# Patient Record
Sex: FEMALE | Race: WHITE | NOT HISPANIC OR LATINO | Employment: FULL TIME | URBAN - METROPOLITAN AREA
[De-identification: names, ages, dates, MRNs, and addresses within clinical notes are randomized per-mention and may not be internally consistent; named-entity substitution may affect disease eponyms.]

---

## 2023-05-25 ENCOUNTER — OFFICE VISIT (OUTPATIENT)
Dept: PRIMARY CARE | Facility: CLINIC | Age: 38
End: 2023-05-25
Payer: COMMERCIAL

## 2023-05-25 VITALS
RESPIRATION RATE: 16 BRPM | HEIGHT: 64 IN | OXYGEN SATURATION: 100 % | DIASTOLIC BLOOD PRESSURE: 70 MMHG | SYSTOLIC BLOOD PRESSURE: 124 MMHG | BODY MASS INDEX: 31.41 KG/M2 | WEIGHT: 184 LBS | HEART RATE: 77 BPM | TEMPERATURE: 97.2 F

## 2023-05-25 DIAGNOSIS — M54.2 NECK PAIN, ACUTE: Primary | ICD-10-CM

## 2023-05-25 PROCEDURE — 99203 OFFICE O/P NEW LOW 30 MIN: CPT | Performed by: FAMILY MEDICINE

## 2023-05-25 PROCEDURE — 1036F TOBACCO NON-USER: CPT | Performed by: FAMILY MEDICINE

## 2023-05-25 RX ORDER — TIZANIDINE HYDROCHLORIDE 4 MG/1
4 CAPSULE, GELATIN COATED ORAL 3 TIMES DAILY PRN
COMMUNITY
Start: 2023-05-23 | End: 2023-07-11 | Stop reason: ALTCHOICE

## 2023-05-25 RX ORDER — NAPROXEN 500 MG/1
500 TABLET ORAL
COMMUNITY
Start: 2023-05-23 | End: 2023-05-30

## 2023-05-25 RX ORDER — METHYLPREDNISOLONE 4 MG/1
TABLET ORAL
Qty: 21 TABLET | Refills: 0 | Status: SHIPPED | OUTPATIENT
Start: 2023-05-25 | End: 2023-06-01

## 2023-05-25 RX ORDER — LIDOCAINE 50 MG/G
1 PATCH TOPICAL
Qty: 14 PATCH | Refills: 0 | COMMUNITY
Start: 2023-05-23 | End: 2023-06-06

## 2023-05-25 ASSESSMENT — ENCOUNTER SYMPTOMS
HEADACHES: 1
NECK PAIN: 1
LOSS OF SENSATION IN FEET: 0
FEVER: 0
LEG PAIN: 0
WEAKNESS: 0
TROUBLE SWALLOWING: 0
PARESIS: 0
TINGLING: 1
VISUAL CHANGE: 0
SYNCOPE: 0
DEPRESSION: 0
PHOTOPHOBIA: 0
WEIGHT LOSS: 0
NUMBNESS: 0
OCCASIONAL FEELINGS OF UNSTEADINESS: 0

## 2023-05-25 ASSESSMENT — PATIENT HEALTH QUESTIONNAIRE - PHQ9
SUM OF ALL RESPONSES TO PHQ9 QUESTIONS 1 AND 2: 0
1. LITTLE INTEREST OR PLEASURE IN DOING THINGS: NOT AT ALL
2. FEELING DOWN, DEPRESSED OR HOPELESS: NOT AT ALL

## 2023-05-25 NOTE — PROGRESS NOTES
"Subjective   Chief Complaint: new patient to establish care (Neck pain and upper back pain x 1 month worse within last week).    Neck Pain   This is a new problem. The current episode started in the past 7 days. The problem occurs constantly. The problem has been unchanged. The pain is associated with nothing. The pain is present in the occipital region, right side, midline and left side. The quality of the pain is described as aching (pulling). The pain is at a severity of 5/10. The pain is moderate. The symptoms are aggravated by bending and position (working at the computer and picking up things). The pain is Same all the time. Stiffness is present All day. Associated symptoms include headaches and tingling. Pertinent negatives include no chest pain, fever, leg pain, numbness, pain with swallowing, paresis, photophobia, syncope, trouble swallowing, visual change, weakness or weight loss. She has tried muscle relaxants and NSAIDs (lidocaine patches) for the symptoms. The treatment provided mild relief.      She went to [facility] care at [facility] Tuesday and they did an exam and sent her home with Naprosyn, Tizanidine and Lidocaine patches. No xrays were done or any other testing. Asked to come see a PCP.    Dee Richmond is a 37 y.o. female who presents for new patient to establish care (Neck pain and upper back pain x 1 month worse within last week).    Review of Systems   Constitutional:  Negative for fever and weight loss.   HENT:  Negative for trouble swallowing.    Eyes:  Negative for photophobia.   Cardiovascular:  Negative for chest pain and syncope.   Musculoskeletal:  Positive for neck pain.   Neurological:  Positive for tingling and headaches. Negative for weakness and numbness.       Objective   Blood Pressure 124/70   Pulse 77   Temperature 36.2 °C (97.2 °F)   Respiration 16   Height 1.626 m (5' 4\")   Weight 83.5 kg (184 lb)   Oxygen Saturation 100%   Body Mass Index 31.58 kg/m²   BSA Body surface " area is 1.94 meters squared.      Physical Exam  Vitals and nursing note reviewed.   Constitutional:       General: She is not in acute distress.     Appearance: Normal appearance. She is normal weight. She is not ill-appearing.   HENT:      Head: Normocephalic.      Right Ear: Tympanic membrane, ear canal and external ear normal.      Left Ear: Tympanic membrane, ear canal and external ear normal.      Nose: Nose normal. No rhinorrhea.      Mouth/Throat:      Mouth: Mucous membranes are moist.      Pharynx: Oropharynx is clear.   Eyes:      Extraocular Movements: Extraocular movements intact.      Conjunctiva/sclera: Conjunctivae normal.      Pupils: Pupils are equal, round, and reactive to light.   Cardiovascular:      Rate and Rhythm: Normal rate and regular rhythm.      Pulses: Normal pulses.      Heart sounds: Normal heart sounds.   Pulmonary:      Effort: Pulmonary effort is normal. No respiratory distress.      Breath sounds: Normal breath sounds. No wheezing.   Abdominal:      General: Abdomen is flat. Bowel sounds are normal.      Palpations: Abdomen is soft.      Tenderness: There is no abdominal tenderness. There is no guarding or rebound.      Hernia: No hernia is present.   Musculoskeletal:         General: Normal range of motion.      Cervical back: Normal range of motion and neck supple. No rigidity or tenderness.      Right lower leg: No edema.      Left lower leg: No edema.   Lymphadenopathy:      Cervical: No cervical adenopathy.   Skin:     General: Skin is warm and dry.      Capillary Refill: Capillary refill takes more than 3 seconds.   Neurological:      General: No focal deficit present.      Mental Status: She is alert and oriented to person, place, and time.      Sensory: No sensory deficit.      Motor: No weakness.      Coordination: Coordination normal.   Psychiatric:         Mood and Affect: Mood normal.         Behavior: Behavior normal.         Thought Content: Thought content normal.          Judgment: Judgment normal.       No results found for any previous visit.     Current Outpatient Medications on File Prior to Visit   Medication Sig Dispense Refill    lidocaine (Lidoderm) 5 % patch Apply 1 patch topically once daily. 14 patch 0    naproxen (Naprosyn) 500 mg tablet Take 1 tablet (500 mg) by mouth.      tiZANidine (Zanaflex) 4 mg capsule Take 1 capsule (4 mg) by mouth 3 times a day as needed.       No current facility-administered medications on file prior to visit.     No images are attached to the encounter.            Assessment/Plan   Problem List Items Addressed This Visit       Neck pain, acute - Primary    Relevant Medications    methylPREDNISolone (Medrol Dospak) 4 mg tablets    Other Relevant Orders    XR cervical spine 2-3 views    Referral to Physical Therapy   While on Medrol dose pack may hold Naproxsyn  Please consider taking Pepcid 20 mg 2 times a day    Will get xrays     Start PT if no improvement.     Try to stop Tizanidine If pain gets worse please call and may try a different muscle relaxor    F/up in 6 weeks for neck pain

## 2023-05-25 NOTE — PATIENT INSTRUCTIONS
While on Medrol dose pack may hold Naproxsyn  Please consider taking Pepcid 20 mg 2 times a day    Will get xrays     Start PT if no improvement.     Try to stop Tizanidine If pain gets worse please call and may try a different muscle relaxer.    F/up in 6 weeks

## 2023-07-11 ENCOUNTER — OFFICE VISIT (OUTPATIENT)
Dept: PRIMARY CARE | Facility: CLINIC | Age: 38
End: 2023-07-11
Payer: COMMERCIAL

## 2023-07-11 VITALS
DIASTOLIC BLOOD PRESSURE: 83 MMHG | HEART RATE: 89 BPM | WEIGHT: 183 LBS | SYSTOLIC BLOOD PRESSURE: 125 MMHG | RESPIRATION RATE: 16 BRPM | TEMPERATURE: 97.5 F | BODY MASS INDEX: 31.24 KG/M2 | OXYGEN SATURATION: 97 % | HEIGHT: 64 IN

## 2023-07-11 DIAGNOSIS — N92.6 IRREGULAR MENSES: ICD-10-CM

## 2023-07-11 DIAGNOSIS — E55.9 VITAMIN D DEFICIENCY: ICD-10-CM

## 2023-07-11 DIAGNOSIS — E53.8 VITAMIN B 12 DEFICIENCY: ICD-10-CM

## 2023-07-11 DIAGNOSIS — Z00.00 ENCOUNTER FOR ANNUAL GENERAL MEDICAL EXAMINATION WITHOUT ABNORMAL FINDINGS IN ADULT: ICD-10-CM

## 2023-07-11 DIAGNOSIS — M54.2 NECK PAIN, ACUTE: Primary | ICD-10-CM

## 2023-07-11 PROCEDURE — 1036F TOBACCO NON-USER: CPT | Performed by: FAMILY MEDICINE

## 2023-07-11 PROCEDURE — 99214 OFFICE O/P EST MOD 30 MIN: CPT | Performed by: FAMILY MEDICINE

## 2023-07-11 ASSESSMENT — ENCOUNTER SYMPTOMS
POLYPHAGIA: 0
POLYDIPSIA: 0
SHORTNESS OF BREATH: 0
HEADACHES: 0
CHEST TIGHTNESS: 0
DIFFICULTY URINATING: 0
CHILLS: 0
DEPRESSION: 0
ABDOMINAL PAIN: 0
BLOOD IN STOOL: 0
FEVER: 0
CONSTIPATION: 0
DIZZINESS: 0
DIARRHEA: 0
LOSS OF SENSATION IN FEET: 0
UNEXPECTED WEIGHT CHANGE: 1
FREQUENCY: 0
WHEEZING: 0
AGITATION: 0
LIGHT-HEADEDNESS: 0
SORE THROAT: 0
FATIGUE: 1
RHINORRHEA: 0
APPETITE CHANGE: 0
OCCASIONAL FEELINGS OF UNSTEADINESS: 0
BACK PAIN: 0
SINUS PRESSURE: 0
DYSURIA: 0
CONFUSION: 0
ARTHRALGIAS: 0
PALPITATIONS: 0
NUMBNESS: 0

## 2023-07-11 ASSESSMENT — PATIENT HEALTH QUESTIONNAIRE - PHQ9
2. FEELING DOWN, DEPRESSED OR HOPELESS: NOT AT ALL
SUM OF ALL RESPONSES TO PHQ9 QUESTIONS 1 AND 2: 0
1. LITTLE INTEREST OR PLEASURE IN DOING THINGS: NOT AT ALL

## 2023-07-11 NOTE — PATIENT INSTRUCTIONS
F/U in August for a PE  Fasting blood work at your convenience    Complete PT for Home exercise program    Call if neck is worse

## 2023-07-11 NOTE — PROGRESS NOTES
"Subjective   Patient ID: Dee Richmond is a 38 y.o. female who presents for follow up on neck  pain  (Patient is going to physical therapy).  Today she is accompanied by alone.     Patient has been following with PT and has had significant improvement with range of motion and decreased pain. There is some room to improve. She is consistently doing home exercises as well.     Last summer she stopped nursing/pumping, since then she has been experiencing significant weight gain 20 lbs, acne, irregular menstrual cycles, heavier bleeding on days 1 & 2, and increased premenstrual syndrome. She is not currently on any forms of birth control.     Review of Systems   Constitutional:  Positive for fatigue and unexpected weight change (20lbs gain in 1 yr). Negative for appetite change, chills and fever.   HENT:  Negative for congestion, ear pain, hearing loss, mouth sores, nosebleeds, postnasal drip, rhinorrhea, sinus pressure and sore throat.    Eyes:  Negative for visual disturbance.   Respiratory:  Negative for chest tightness, shortness of breath and wheezing.    Cardiovascular:  Negative for chest pain, palpitations and leg swelling.   Gastrointestinal:  Negative for abdominal pain, blood in stool, constipation and diarrhea.   Endocrine: Negative for cold intolerance, heat intolerance, polydipsia and polyphagia.   Genitourinary:  Negative for difficulty urinating, dysuria, frequency and urgency.   Musculoskeletal:  Negative for arthralgias and back pain.   Neurological:  Negative for dizziness, light-headedness, numbness and headaches.   Psychiatric/Behavioral:  Negative for agitation, behavioral problems and confusion.        Objective   Blood Pressure 125/83   Pulse 89   Temperature 36.4 °C (97.5 °F)   Respiration 16   Height 1.626 m (5' 4\")   Weight 83 kg (183 lb)   Oxygen Saturation 97%   Body Mass Index 31.41 kg/m²   BSA: 1.94 meters squared  Growth percentiles: Facility age limit for growth %reno is 20 " years. Facility age limit for growth %reno is 20 years.     Physical Exam  Vitals and nursing note reviewed.   Constitutional:       General: She is not in acute distress.     Appearance: Normal appearance. She is normal weight. She is not ill-appearing.   HENT:      Head: Normocephalic.      Right Ear: Tympanic membrane, ear canal and external ear normal.      Left Ear: Tympanic membrane, ear canal and external ear normal.      Nose: Nose normal. No rhinorrhea.      Mouth/Throat:      Mouth: Mucous membranes are moist.      Pharynx: Oropharynx is clear.   Eyes:      Extraocular Movements: Extraocular movements intact.      Conjunctiva/sclera: Conjunctivae normal.      Pupils: Pupils are equal, round, and reactive to light.   Cardiovascular:      Rate and Rhythm: Normal rate and regular rhythm.      Pulses: Normal pulses.      Heart sounds: Normal heart sounds.   Pulmonary:      Effort: Pulmonary effort is normal. No respiratory distress.      Breath sounds: Normal breath sounds. No wheezing.   Abdominal:      General: Abdomen is flat. Bowel sounds are normal.      Palpations: Abdomen is soft.      Tenderness: There is no abdominal tenderness. There is no guarding or rebound.      Hernia: No hernia is present.   Musculoskeletal:         General: Normal range of motion.      Cervical back: Normal range of motion and neck supple. No rigidity or tenderness.      Right lower leg: No edema.      Left lower leg: No edema.   Lymphadenopathy:      Cervical: No cervical adenopathy.   Skin:     General: Skin is warm and dry.      Capillary Refill: Capillary refill takes more than 3 seconds.   Neurological:      General: No focal deficit present.      Mental Status: She is alert and oriented to person, place, and time.      Sensory: No sensory deficit.      Motor: No weakness.      Coordination: Coordination normal.   Psychiatric:         Mood and Affect: Mood normal.         Behavior: Behavior normal.         Thought Content:  Thought content normal.         Judgment: Judgment normal.         Assessment/Plan   Problem List Items Addressed This Visit       Neck pain, acute - Primary     Other Visit Diagnoses       Encounter for annual general medical examination without abnormal findings in adult        Relevant Orders    Lipid Panel    CBC and Auto Differential    Comprehensive Metabolic Panel    Irregular menses        Relevant Orders    Insulin, Random    Iron and TIBC    Ferritin    TSH with reflex to Free T4 if abnormal    Folate    US pelvis transvaginal    FSH & LH    Progesterone    Estrogens, Total    Vitamin D deficiency        Relevant Orders    Vitamin D, Total    Vitamin B 12 deficiency        Relevant Orders    Vitamin B12              F/U in August for a PE

## 2023-07-15 ENCOUNTER — LAB (OUTPATIENT)
Dept: LAB | Facility: LAB | Age: 38
End: 2023-07-15
Payer: COMMERCIAL

## 2023-07-15 DIAGNOSIS — E53.8 VITAMIN B 12 DEFICIENCY: ICD-10-CM

## 2023-07-15 DIAGNOSIS — Z00.00 ENCOUNTER FOR ANNUAL GENERAL MEDICAL EXAMINATION WITHOUT ABNORMAL FINDINGS IN ADULT: ICD-10-CM

## 2023-07-15 DIAGNOSIS — E55.9 VITAMIN D DEFICIENCY: ICD-10-CM

## 2023-07-15 DIAGNOSIS — N92.6 IRREGULAR MENSES: ICD-10-CM

## 2023-07-15 LAB
ALANINE AMINOTRANSFERASE (SGPT) (U/L) IN SER/PLAS: 20 U/L (ref 7–45)
ALBUMIN (G/DL) IN SER/PLAS: 4.6 G/DL (ref 3.4–5)
ALKALINE PHOSPHATASE (U/L) IN SER/PLAS: 64 U/L (ref 33–110)
ANION GAP IN SER/PLAS: 12 MMOL/L (ref 10–20)
ASPARTATE AMINOTRANSFERASE (SGOT) (U/L) IN SER/PLAS: 19 U/L (ref 9–39)
BASOPHILS (10*3/UL) IN BLOOD BY AUTOMATED COUNT: 0.03 X10E9/L (ref 0–0.1)
BASOPHILS/100 LEUKOCYTES IN BLOOD BY AUTOMATED COUNT: 0.6 % (ref 0–2)
BILIRUBIN TOTAL (MG/DL) IN SER/PLAS: 0.8 MG/DL (ref 0–1.2)
CALCIDIOL (25 OH VITAMIN D3) (NG/ML) IN SER/PLAS: 42 NG/ML
CALCIUM (MG/DL) IN SER/PLAS: 9.7 MG/DL (ref 8.6–10.6)
CARBON DIOXIDE, TOTAL (MMOL/L) IN SER/PLAS: 27 MMOL/L (ref 21–32)
CHLORIDE (MMOL/L) IN SER/PLAS: 104 MMOL/L (ref 98–107)
CHOLESTEROL (MG/DL) IN SER/PLAS: 166 MG/DL (ref 0–199)
CHOLESTEROL IN HDL (MG/DL) IN SER/PLAS: 66.6 MG/DL
CHOLESTEROL/HDL RATIO: 2.5
CREATININE (MG/DL) IN SER/PLAS: 0.74 MG/DL (ref 0.5–1.05)
EOSINOPHILS (10*3/UL) IN BLOOD BY AUTOMATED COUNT: 0.3 X10E9/L (ref 0–0.7)
EOSINOPHILS/100 LEUKOCYTES IN BLOOD BY AUTOMATED COUNT: 6.1 % (ref 0–6)
ERYTHROCYTE DISTRIBUTION WIDTH (RATIO) BY AUTOMATED COUNT: 12.9 % (ref 11.5–14.5)
ERYTHROCYTE MEAN CORPUSCULAR HEMOGLOBIN CONCENTRATION (G/DL) BY AUTOMATED: 32.2 G/DL (ref 32–36)
ERYTHROCYTE MEAN CORPUSCULAR VOLUME (FL) BY AUTOMATED COUNT: 90 FL (ref 80–100)
ERYTHROCYTES (10*6/UL) IN BLOOD BY AUTOMATED COUNT: 4.43 X10E12/L (ref 4–5.2)
FERRITIN (UG/LL) IN SER/PLAS: 28 UG/L (ref 8–150)
GFR FEMALE: >90 ML/MIN/1.73M2
GLUCOSE (MG/DL) IN SER/PLAS: 86 MG/DL (ref 74–99)
HEMATOCRIT (%) IN BLOOD BY AUTOMATED COUNT: 39.7 % (ref 36–46)
HEMOGLOBIN (G/DL) IN BLOOD: 12.8 G/DL (ref 12–16)
IMMATURE GRANULOCYTES/100 LEUKOCYTES IN BLOOD BY AUTOMATED COUNT: 0.4 % (ref 0–0.9)
INSULIN: 12 UIU/ML (ref 3–25)
IRON (UG/DL) IN SER/PLAS: 75 UG/DL (ref 35–150)
IRON BINDING CAPACITY (UG/DL) IN SER/PLAS: 439 UG/DL (ref 240–445)
IRON SATURATION (%) IN SER/PLAS: 17 % (ref 25–45)
LDL: 69 MG/DL (ref 0–99)
LEUKOCYTES (10*3/UL) IN BLOOD BY AUTOMATED COUNT: 4.9 X10E9/L (ref 4.4–11.3)
LYMPHOCYTES (10*3/UL) IN BLOOD BY AUTOMATED COUNT: 1 X10E9/L (ref 1.2–4.8)
LYMPHOCYTES/100 LEUKOCYTES IN BLOOD BY AUTOMATED COUNT: 20.4 % (ref 13–44)
MONOCYTES (10*3/UL) IN BLOOD BY AUTOMATED COUNT: 0.23 X10E9/L (ref 0.1–1)
MONOCYTES/100 LEUKOCYTES IN BLOOD BY AUTOMATED COUNT: 4.7 % (ref 2–10)
NEUTROPHILS (10*3/UL) IN BLOOD BY AUTOMATED COUNT: 3.33 X10E9/L (ref 1.2–7.7)
NEUTROPHILS/100 LEUKOCYTES IN BLOOD BY AUTOMATED COUNT: 67.8 % (ref 40–80)
NRBC (PER 100 WBCS) BY AUTOMATED COUNT: 0 /100 WBC (ref 0–0)
PLATELETS (10*3/UL) IN BLOOD AUTOMATED COUNT: 279 X10E9/L (ref 150–450)
POTASSIUM (MMOL/L) IN SER/PLAS: 4.3 MMOL/L (ref 3.5–5.3)
PROGESTERONE (NG/ML) IN SER/PLAS: 0.3 NG/ML
PROTEIN TOTAL: 7.3 G/DL (ref 6.4–8.2)
SODIUM (MMOL/L) IN SER/PLAS: 139 MMOL/L (ref 136–145)
THYROTROPIN (MIU/L) IN SER/PLAS BY DETECTION LIMIT <= 0.05 MIU/L: 2.41 MIU/L (ref 0.44–3.98)
TRIGLYCERIDE (MG/DL) IN SER/PLAS: 152 MG/DL (ref 0–149)
UREA NITROGEN (MG/DL) IN SER/PLAS: 8 MG/DL (ref 6–23)
VLDL: 30 MG/DL (ref 0–40)

## 2023-07-15 PROCEDURE — 83525 ASSAY OF INSULIN: CPT

## 2023-07-15 PROCEDURE — 84144 ASSAY OF PROGESTERONE: CPT

## 2023-07-15 PROCEDURE — 83001 ASSAY OF GONADOTROPIN (FSH): CPT

## 2023-07-15 PROCEDURE — 82607 VITAMIN B-12: CPT

## 2023-07-15 PROCEDURE — 83002 ASSAY OF GONADOTROPIN (LH): CPT

## 2023-07-15 PROCEDURE — 82672 ASSAY OF ESTROGEN: CPT

## 2023-07-15 PROCEDURE — 80061 LIPID PANEL: CPT

## 2023-07-15 PROCEDURE — 83550 IRON BINDING TEST: CPT

## 2023-07-15 PROCEDURE — 85025 COMPLETE CBC W/AUTO DIFF WBC: CPT

## 2023-07-15 PROCEDURE — 36415 COLL VENOUS BLD VENIPUNCTURE: CPT

## 2023-07-15 PROCEDURE — 82306 VITAMIN D 25 HYDROXY: CPT

## 2023-07-15 PROCEDURE — 83540 ASSAY OF IRON: CPT

## 2023-07-15 PROCEDURE — 84443 ASSAY THYROID STIM HORMONE: CPT

## 2023-07-15 PROCEDURE — 80053 COMPREHEN METABOLIC PANEL: CPT

## 2023-07-15 PROCEDURE — 82746 ASSAY OF FOLIC ACID SERUM: CPT

## 2023-07-15 PROCEDURE — 82728 ASSAY OF FERRITIN: CPT

## 2023-07-16 LAB
COBALAMIN (VITAMIN B12) (PG/ML) IN SER/PLAS: 283 PG/ML (ref 211–911)
FOLATE (NG/ML) IN SER/PLAS: >24 NG/ML
FOLLITROPIN (IU/L) IN SER/PLAS: 3.3 IU/L
LUTEINIZING HORMONE (IU/ML) IN SER/PLAS: 4.2 IU/L

## 2023-07-20 ENCOUNTER — CLINICAL SUPPORT (OUTPATIENT)
Dept: PRIMARY CARE | Facility: CLINIC | Age: 38
End: 2023-07-20
Payer: COMMERCIAL

## 2023-07-20 DIAGNOSIS — E53.8 VITAMIN B 12 DEFICIENCY: ICD-10-CM

## 2023-07-20 LAB — ESTROGEN,TOTAL: 284 PG/ML

## 2023-07-20 PROCEDURE — 96372 THER/PROPH/DIAG INJ SC/IM: CPT | Performed by: FAMILY MEDICINE

## 2023-07-20 RX ORDER — CYANOCOBALAMIN 1000 UG/ML
1000 INJECTION, SOLUTION INTRAMUSCULAR; SUBCUTANEOUS ONCE
Status: COMPLETED | OUTPATIENT
Start: 2023-07-20 | End: 2023-07-20

## 2023-07-20 RX ADMIN — CYANOCOBALAMIN 1000 MCG: 1000 INJECTION, SOLUTION INTRAMUSCULAR; SUBCUTANEOUS at 18:08

## 2023-07-20 NOTE — PROGRESS NOTES
Pt presents for B12 injection per Dr Frank. 1 mL given subcutaneously in upper L arm; no issues w/ injection. Pt tolerated well.

## 2023-07-20 NOTE — RESULT ENCOUNTER NOTE
Patient has a possible left hemorrhagic ovarian cyst and will need a follow-up ultrasound in 4 to 6 weeks.  Otherwise normal ultrasound

## 2023-07-27 ENCOUNTER — OFFICE VISIT (OUTPATIENT)
Dept: PRIMARY CARE | Facility: CLINIC | Age: 38
End: 2023-07-27
Payer: COMMERCIAL

## 2023-07-27 VITALS
WEIGHT: 183 LBS | DIASTOLIC BLOOD PRESSURE: 81 MMHG | BODY MASS INDEX: 31.24 KG/M2 | HEART RATE: 85 BPM | TEMPERATURE: 97.7 F | OXYGEN SATURATION: 97 % | SYSTOLIC BLOOD PRESSURE: 115 MMHG | RESPIRATION RATE: 16 BRPM | HEIGHT: 64 IN

## 2023-07-27 DIAGNOSIS — E53.8 VITAMIN B 12 DEFICIENCY: ICD-10-CM

## 2023-07-27 DIAGNOSIS — N92.6 IRREGULAR MENSTRUATION: Primary | ICD-10-CM

## 2023-07-27 DIAGNOSIS — N83.202 CYST OF LEFT OVARY: ICD-10-CM

## 2023-07-27 DIAGNOSIS — F41.9 ANXIETY: ICD-10-CM

## 2023-07-27 PROCEDURE — 99214 OFFICE O/P EST MOD 30 MIN: CPT | Performed by: FAMILY MEDICINE

## 2023-07-27 PROCEDURE — 1036F TOBACCO NON-USER: CPT | Performed by: FAMILY MEDICINE

## 2023-07-27 PROCEDURE — 96372 THER/PROPH/DIAG INJ SC/IM: CPT | Performed by: FAMILY MEDICINE

## 2023-07-27 RX ORDER — FLUOXETINE 10 MG/1
10 CAPSULE ORAL DAILY
Qty: 30 CAPSULE | Refills: 1 | Status: SHIPPED | OUTPATIENT
Start: 2023-07-27 | End: 2023-08-22

## 2023-07-27 RX ORDER — CYANOCOBALAMIN 1000 UG/ML
1000 INJECTION, SOLUTION INTRAMUSCULAR; SUBCUTANEOUS ONCE
Status: COMPLETED | OUTPATIENT
Start: 2023-07-27 | End: 2023-07-27

## 2023-07-27 RX ADMIN — CYANOCOBALAMIN 1000 MCG: 1000 INJECTION, SOLUTION INTRAMUSCULAR; SUBCUTANEOUS at 15:18

## 2023-07-27 ASSESSMENT — ENCOUNTER SYMPTOMS
IRRITABILITY: 1
PANIC: 1
DEPRESSION: 0
NERVOUS/ANXIOUS: 1
DECREASED CONCENTRATION: 1
OCCASIONAL FEELINGS OF UNSTEADINESS: 0
INSOMNIA: 1
PALPITATIONS: 1
LOSS OF SENSATION IN FEET: 0
SHORTNESS OF BREATH: 1
DEPRESSED MOOD: 1
HYPERVENTILATION: 0
DIZZINESS: 0
THOUGHT CONTENT - OBSESSIONS: 0
NAUSEA: 0
COMPULSIONS: 0
CONFUSION: 1
RESTLESSNESS: 1
MUSCLE TENSION: 0
FEELING OF CHOKING: 1
MALAISE: 1

## 2023-07-27 ASSESSMENT — PATIENT HEALTH QUESTIONNAIRE - PHQ9
SUM OF ALL RESPONSES TO PHQ9 QUESTIONS 1 AND 2: 0
2. FEELING DOWN, DEPRESSED OR HOPELESS: NOT AT ALL
1. LITTLE INTEREST OR PLEASURE IN DOING THINGS: NOT AT ALL

## 2023-07-27 NOTE — PROGRESS NOTES
"Subjective   Patient ID: Dee Richmond is a 38 y.o. female who presents for review labs and ultrasound results.  Today she is accompanied by alone.     Anxiety  Presents for initial visit. Onset was 1 to 5 years ago. The problem has been gradually worsening. Symptoms include confusion, decreased concentration, depressed mood, excessive worry, feeling of choking, insomnia, irritability, malaise, nervous/anxious behavior, palpitations, panic, restlessness and shortness of breath. Patient reports no chest pain, compulsions, dizziness, dry mouth, hyperventilation, impotence, muscle tension, nausea, obsessions or suicidal ideas. Symptoms occur most days. The severity of symptoms is incapacitating. The symptoms are aggravated by social activities, specific phobias, work stress and family issues. The quality of sleep is poor. Nighttime awakenings: several.     Risk factors include family history. There is no history of anemia, anxiety/panic attacks, arrhythmia, asthma, bipolar disorder, CAD, CHF, chronic lung disease, depression, fibromyalgia, hyperthyroidism or suicide attempts. Past treatments include nothing.       Review of Systems   Constitutional:  Positive for irritability.   Respiratory:  Positive for shortness of breath.    Cardiovascular:  Positive for palpitations. Negative for chest pain.   Gastrointestinal:  Negative for nausea.   Genitourinary:  Negative for impotence.   Neurological:  Negative for dizziness.   Psychiatric/Behavioral:  Positive for confusion and decreased concentration. Negative for suicidal ideas. The patient is nervous/anxious and has insomnia.        Objective   Blood Pressure 115/81   Pulse 85   Temperature 36.5 °C (97.7 °F)   Respiration 16   Height 1.626 m (5' 4\")   Weight 83 kg (183 lb)   Oxygen Saturation 97%   Body Mass Index 31.41 kg/m²   BSA: 1.94 meters squared  Growth percentiles: Facility age limit for growth %reno is 20 years. Facility age limit for growth %reno is 20 " years.     Physical Exam  Vitals and nursing note reviewed.   Constitutional:       Appearance: Normal appearance.   HENT:      Head: Normocephalic.      Right Ear: Tympanic membrane normal.      Left Ear: Tympanic membrane normal.   Cardiovascular:      Rate and Rhythm: Normal rate and regular rhythm.      Pulses: Normal pulses.      Heart sounds: Normal heart sounds.   Abdominal:      General: Abdomen is flat. Bowel sounds are normal.   Musculoskeletal:         General: Normal range of motion.   Neurological:      Mental Status: She is alert.   Psychiatric:         Mood and Affect: Mood normal.         Behavior: Behavior normal.         Assessment/Plan   Problem List Items Addressed This Visit       Cyst of left ovary    Vitamin B 12 deficiency    Relevant Medications    cyanocobalamin (Vitamin B-12) injection 1,000 mcg    Anxiety    Relevant Medications    FLUoxetine (PROzac) 10 mg capsule     Other Visit Diagnoses       Irregular menstruation    -  Primary    Relevant Orders    US pelvis transvaginal          F/U in 3-4 weeks for anxiety and US results  Refer to counseling for CBT therapy

## 2023-07-27 NOTE — PATIENT INSTRUCTIONS
F/U in 3-4 weeks for anxiety and US results  Depression      What is depression?;  When doctors talk about depression, they mean the medical illness called major depression. Someone who has major depression has symptoms nearly every day, all day, for 2 weeks or longer. There is also a minor form of depression that causes less severe symptoms. Both kinds of depression have the same causes and treatment.;  Depression can affect people of all ages and is different for every person. A person who has depression can't control his or her feelings. If you or your child, teen, or older relative is depressed, it's not his or her fault.     Women are twice as likely as men to experience depression. The reason for this is unknown, but changes in a woman's hormone levels may be related to depression.     What are the symptoms of depression?;  The symptoms of depression are different for every person. You may have one or many of the symptoms listed below. Your symptoms may include only emotional or only physical symptoms, or both.;     Emotional symptoms  Crying easily or for no reason ;  Feeling guilty or worthless ;  Feeling restless, irritated, and easily annoyed ;  Feeling sad, numb, or hopeless ;  Losing interest or pleasure in things you used to enjoy (including sex) ;  Thinking about death or suicide;     Physical symptoms  Changes in appetite (eating more than usual, or eating less than usual) ;  Feeling very tired all the time ;  Having other aches and pains that don't get better with treatment ;  Having trouble paying attention, recalling things, concentrating, and making decisions ;  Headaches, backaches, or digestive problems ;  Sleeping too much, or having problems sleeping ;  Unintended weight loss or gain;  The symptoms of depression may be different for children, teens, and seniors.;     What causes depression?  Depression may be caused by an imbalance of chemicals in the brain. Sometimes there aren't enough  "chemical messengers (called neurotransmitters) in the brain. Examples of neurotransmitters that affect your mood are serotonin (say: \"sair-a-tone-in\"), norepinephrine (say: \"nor-ep-in-ef-rin\"), and dopamine (say: \"dope-a-mean\"). A chemical imbalance in the brain may be caused by one or more of the following:;  Your genes. Sometimes depression is hereditary, meaning it runs in your family. If you have a parent or sibling who has depression, you may be more at risk for having depression yourself. ;  A medical condition. Problems with your thyroid, nutrient deficiencies, or chronic diseases such as heart disease, diabetes, or cancer may cause depression. ;  Events in your life. Depression can be triggered by stressful events in your life, such as the death of someone you love, a divorce, chronic illness, or loss of a job.   Medicines, drugs, or alcohol. Taking certain medicines, abusing drugs or alcohol, or having other illnesses can also lead to depression.;     Depression is not caused by personal weakness, laziness, or lack of willpower.;     Can giving birth cause depression?;  In the days following the birth of a baby, it is common for some mothers to have mood swings. They may feel a little depressed, have a hard time concentrating, lose their appetite, or find that they can't sleep well even when the baby is asleep. This is called the baby blues and goes away within 10 days after delivery. However, some women have worse symptoms or symptoms that last longer. This is called postpartum depression.     How is depression treated?  Depression can be treated with medicines, with counseling <[email or web address]-[email or web address]-[email or web address]-[email or web address]-counseling.html>, or with both. A nutritious diet, exercising on a regular basis, and avoiding alcohol, drugs, and too much caffeine can also help.;     How long will the depression last?  This depends on how soon you get help. " Left untreated, depression can last for weeks, months, or even years. The main risk in not getting treatment is suicide. Treatment can help depression lift in 8 to 12 weeks or less.;     What medicines are used to treat depression?;  Medicines that treat depression are called antidepressants. They help increase the number of chemical messengers (serotonin, norepinephrine, dopamine) in your brain.  Antidepressants work differently for different people. They also have different side effects. So, even if one medicine bothers you or doesn't work for you, another may help. You may notice improvement as soon as 1 week after you start taking the medicine. But you probably won't see the full effects for about 8 to 12 weeks. You may have side effects at first, but they tend to decrease after a couple of weeks. Don't stop taking the medicine without checking with your doctor first.;     How does counseling help?;   A combination of medicine and talk therapy is usually the most effective way of treating more severe depression. If you continue the combination treatment for at least a year, you are less likely to have depression come back.;you talk with a trained therapist or counselor about things that are going on in your life. The focus may be on your thoughts and beliefs, on things that happened in your past, or on your relationships. Or the focus may be on your behavior, how it's affecting you, and what you can do differently.      Tips on getting through depression  DO:  Pace yourself.   Get involved in activities that make you feel good or feel like you've achieved something.   Avoid drugs and alcohol. Both make depression worse. Both can cause dangerous side effects with antidepressant medicines.   Exercise often to make yourself feel better. Physical activity seems to cause a chemical reaction in the body that can improve your mood. Exercising 4 to 6 times a week for at least 30 minutes each time is a good goal. But  "even less activity can be helpful.   Eat balanced meals and healthful foods.   Get enough sleep.   Take your medicine and/or go to counseling as often as your doctor recommends. Your medicine won't work if you only take it once in a while.   Set small goals for yourself, because you may have less energy.   Encourage yourself.   Get as much information as you can about depression and how to treat it.   Call your doctor or the local suicide crisis center right away if you start thinking about suicide.  DON'T:  Don't isolate yourself. Stay in touch with your loved ones and friends, your [employer] advisor, and your family doctor.   Don't believe negative thoughts you may have, such as blaming yourself or expecting to fail. This thinking is part of depression. These thoughts will go away as your depression lifts.   Don't blame yourself for your depression. You didn't cause it.   Don't make major life decisions (for example, about separation or divorce). You may not be thinking clearly while you are depressed, so the decisions you make at this time may not be the best ones for you. If you must make a big decision, ask someone you trust to help you.   Don't expect to do everything you normally can. Set a realistic schedule.   Don't get discouraged. It will take time for your depression to lift fully. Be patient with yourself.   Don't give up.     READ: \"The Mindfulness and Acceptance Workbook for Depression\" by Ana  \"The Mindful Way Through Depression\" by Luisito Torres Segal, and [employer]        Suicide;  People who have depression sometimes think about suicide. This thinking is a common part of the depression. If you have thoughts about hurting yourself, tell someone. You could tell your doctor, your friends, your family, or call your local suicide hotline, such as the National Suicide Prevention Lifeline at 1-920.753.4242.;    "

## 2023-08-19 DIAGNOSIS — F41.9 ANXIETY: ICD-10-CM

## 2023-08-22 RX ORDER — FLUOXETINE 10 MG/1
10 CAPSULE ORAL DAILY
Qty: 30 CAPSULE | Refills: 1 | Status: SHIPPED | OUTPATIENT
Start: 2023-08-22 | End: 2023-10-09 | Stop reason: SDUPTHER

## 2023-10-09 ENCOUNTER — OFFICE VISIT (OUTPATIENT)
Dept: PRIMARY CARE | Facility: CLINIC | Age: 38
End: 2023-10-09
Payer: COMMERCIAL

## 2023-10-09 VITALS
DIASTOLIC BLOOD PRESSURE: 85 MMHG | HEART RATE: 78 BPM | RESPIRATION RATE: 18 BRPM | WEIGHT: 185.2 LBS | BODY MASS INDEX: 31.62 KG/M2 | OXYGEN SATURATION: 98 % | HEIGHT: 64 IN | SYSTOLIC BLOOD PRESSURE: 121 MMHG

## 2023-10-09 DIAGNOSIS — E53.8 VITAMIN B 12 DEFICIENCY: ICD-10-CM

## 2023-10-09 DIAGNOSIS — Z23 NEED FOR IMMUNIZATION AGAINST INFLUENZA: ICD-10-CM

## 2023-10-09 DIAGNOSIS — F41.9 ANXIETY: Primary | ICD-10-CM

## 2023-10-09 PROCEDURE — 1036F TOBACCO NON-USER: CPT | Performed by: FAMILY MEDICINE

## 2023-10-09 PROCEDURE — 90471 IMMUNIZATION ADMIN: CPT | Performed by: FAMILY MEDICINE

## 2023-10-09 PROCEDURE — 96372 THER/PROPH/DIAG INJ SC/IM: CPT | Performed by: FAMILY MEDICINE

## 2023-10-09 PROCEDURE — 90686 IIV4 VACC NO PRSV 0.5 ML IM: CPT | Performed by: FAMILY MEDICINE

## 2023-10-09 PROCEDURE — 99214 OFFICE O/P EST MOD 30 MIN: CPT | Performed by: FAMILY MEDICINE

## 2023-10-09 RX ORDER — CYANOCOBALAMIN 1000 UG/ML
1000 INJECTION, SOLUTION INTRAMUSCULAR; SUBCUTANEOUS ONCE
Status: COMPLETED | OUTPATIENT
Start: 2023-10-09 | End: 2023-10-09

## 2023-10-09 RX ORDER — FLUOXETINE 10 MG/1
10 CAPSULE ORAL DAILY
Qty: 90 CAPSULE | Refills: 1 | Status: SHIPPED | OUTPATIENT
Start: 2023-10-09 | End: 2024-04-03

## 2023-10-09 RX ADMIN — CYANOCOBALAMIN 1000 MCG: 1000 INJECTION, SOLUTION INTRAMUSCULAR; SUBCUTANEOUS at 15:15

## 2023-10-09 ASSESSMENT — ENCOUNTER SYMPTOMS
DIARRHEA: 0
SINUS PRESSURE: 0
NECK STIFFNESS: 0
CONSTIPATION: 0
COLOR CHANGE: 0
PALPITATIONS: 0
VOICE CHANGE: 0
ABDOMINAL PAIN: 0
DIZZINESS: 0
WOUND: 0
CHEST TIGHTNESS: 0
SORE THROAT: 0
FLANK PAIN: 0
TROUBLE SWALLOWING: 0
JOINT SWELLING: 0
AGITATION: 0
EYE REDNESS: 0
SHORTNESS OF BREATH: 0
ADENOPATHY: 0
HEMATURIA: 0
BACK PAIN: 0
ACTIVITY CHANGE: 0
VOMITING: 0
FATIGUE: 0
DIAPHORESIS: 0
DYSURIA: 0
CHILLS: 0
SLEEP DISTURBANCE: 0
RHINORRHEA: 0
FEVER: 0
BLOOD IN STOOL: 0
COUGH: 0
POLYPHAGIA: 0
FACIAL SWELLING: 0
MYALGIAS: 0
BRUISES/BLEEDS EASILY: 0
ARTHRALGIAS: 0
APPETITE CHANGE: 0
POLYDIPSIA: 0
NERVOUS/ANXIOUS: 0
EYE PAIN: 0
WHEEZING: 0
FREQUENCY: 0
UNEXPECTED WEIGHT CHANGE: 0
EYE DISCHARGE: 0
NAUSEA: 0
EYE ITCHING: 0
SINUS PAIN: 0

## 2023-10-09 ASSESSMENT — PATIENT HEALTH QUESTIONNAIRE - PHQ9
2. FEELING DOWN, DEPRESSED OR HOPELESS: NOT AT ALL
1. LITTLE INTEREST OR PLEASURE IN DOING THINGS: NOT AT ALL
SUM OF ALL RESPONSES TO PHQ9 QUESTIONS 1 & 2: 0

## 2023-10-09 NOTE — PROGRESS NOTES
Subjective   Patient ID: Dee Richmond is a 38 y.o. female who presents for Follow-up (Meds/B-12 injection).    HPI   She reports having fatigue towards the middle of September. The main side effect from the Prozac she has noticed is headaches about 1x every 2 weeks that average pain 4/10. It does not disrupt her daily activities. She reports having less anxiety, she is very pleased with the effects of the Prozac. She is in the midst of setting up appointment with therapist for CBT. The medication has helped her concentration throughout the workday.     BMI today is 31.79kg/m    Review of Systems   Constitutional:  Negative for activity change, appetite change, chills, diaphoresis, fatigue, fever and unexpected weight change.   HENT:  Negative for congestion, dental problem, drooling, ear discharge, ear pain, facial swelling, hearing loss, nosebleeds, postnasal drip, rhinorrhea, sinus pressure, sinus pain, sneezing, sore throat, tinnitus, trouble swallowing and voice change.    Eyes:  Negative for pain, discharge, redness, itching and visual disturbance.   Respiratory:  Negative for cough, chest tightness, shortness of breath and wheezing.    Cardiovascular:  Negative for chest pain, palpitations and leg swelling.   Gastrointestinal:  Negative for abdominal pain, blood in stool, constipation, diarrhea, nausea and vomiting.   Endocrine: Negative for cold intolerance, heat intolerance, polydipsia, polyphagia and polyuria.   Genitourinary:  Negative for decreased urine volume, dysuria, flank pain, frequency, hematuria and urgency.   Musculoskeletal:  Negative for arthralgias, back pain, gait problem, joint swelling, myalgias and neck stiffness.   Skin:  Negative for color change, pallor, rash and wound.   Neurological:  Negative for dizziness.   Hematological:  Negative for adenopathy. Does not bruise/bleed easily.   Psychiatric/Behavioral:  Negative for agitation, behavioral problems and sleep disturbance. The  "patient is not nervous/anxious.        Objective   Blood Pressure 132/88 (BP Location: Left arm, Patient Position: Sitting, BP Cuff Size: Large adult)   Pulse 78   Respiration 18   Height 1.626 m (5' 4\")   Weight 84 kg (185 lb 3.2 oz)   Oxygen Saturation 98%   Body Mass Index 31.79 kg/m²     Physical Exam  Vitals and nursing note reviewed. Exam conducted with a chaperone present.   Constitutional:       General: She is not in acute distress.     Appearance: Normal appearance. She is obese. She is not ill-appearing, toxic-appearing or diaphoretic.   HENT:      Head: Normocephalic and atraumatic.      Right Ear: Tympanic membrane normal.      Left Ear: Tympanic membrane normal.      Nose: Nose normal.      Mouth/Throat:      Mouth: Mucous membranes are moist.      Pharynx: Oropharynx is clear.   Eyes:      Conjunctiva/sclera: Conjunctivae normal.      Pupils: Pupils are equal, round, and reactive to light.   Cardiovascular:      Rate and Rhythm: Normal rate and regular rhythm.      Pulses: Normal pulses.      Heart sounds: Normal heart sounds. No murmur heard.     No friction rub. No gallop.   Pulmonary:      Effort: Pulmonary effort is normal. No respiratory distress.      Breath sounds: Normal breath sounds. No stridor. No wheezing, rhonchi or rales.   Chest:      Chest wall: No tenderness.   Musculoskeletal:         General: Normal range of motion.      Cervical back: Normal range of motion.   Skin:     Capillary Refill: Capillary refill takes less than 2 seconds.   Neurological:      Mental Status: She is alert.   Psychiatric:         Mood and Affect: Mood normal.         Behavior: Behavior normal.         Assessment/Plan   Problem List Items Addressed This Visit             ICD-10-CM    Vitamin B 12 deficiency E53.8    Relevant Medications    cyanocobalamin (Vitamin B-12) injection 1,000 mcg (Start on 10/9/2023  3:15 PM)    Anxiety - Primary F41.9    Relevant Medications    FLUoxetine (PROzac) 10 mg capsule "     Other Visit Diagnoses       Diagnosis Codes    Need for immunization against influenza     Z23    Relevant Orders    Flu vaccine (IIV4) age 6 months and greater, preservative free        F/U in 3 months for anxiety  Us of Pelvis to be done at your earliest  Refills sent to the pharmacy  Flu shot and Vit B12 roday  Call if any concerns    Depression      What is depression?;  When doctors talk about depression, they mean the medical illness called major depression. Someone who has major depression has symptoms nearly every day, all day, for 2 weeks or longer. There is also a minor form of depression that causes less severe symptoms. Both kinds of depression have the same causes and treatment.;  Depression can affect people of all ages and is different for every person. A person who has depression can't control his or her feelings. If you or your child, teen, or older relative is depressed, it's not his or her fault.     Women are twice as likely as men to experience depression. The reason for this is unknown, but changes in a woman's hormone levels may be related to depression.     What are the symptoms of depression?;  The symptoms of depression are different for every person. You may have one or many of the symptoms listed below. Your symptoms may include only emotional or only physical symptoms, or both.;     Emotional symptoms  Crying easily or for no reason ;  Feeling guilty or worthless ;  Feeling restless, irritated, and easily annoyed ;  Feeling sad, numb, or hopeless ;  Losing interest or pleasure in things you used to enjoy (including sex) ;  Thinking about death or suicide;     Physical symptoms  Changes in appetite (eating more than usual, or eating less than usual) ;  Feeling very tired all the time ;  Having other aches and pains that don't get better with treatment ;  Having trouble paying attention, recalling things, concentrating, and making decisions ;  Headaches, backaches, or digestive  "problems ;  Sleeping too much, or having problems sleeping ;  Unintended weight loss or gain;  The symptoms of depression may be different for children, teens, and seniors.;     What causes depression?  Depression may be caused by an imbalance of chemicals in the brain. Sometimes there aren't enough chemical messengers (called neurotransmitters) in the brain. Examples of neurotransmitters that affect your mood are serotonin (say: \"sair-a-tone-in\"), norepinephrine (say: \"nor-ep-in-ef-rin\"), and dopamine (say: \"dope-a-mean\"). A chemical imbalance in the brain may be caused by one or more of the following:;  Your genes. Sometimes depression is hereditary, meaning it runs in your family. If you have a parent or sibling who has depression, you may be more at risk for having depression yourself. ;  A medical condition. Problems with your thyroid, nutrient deficiencies, or chronic diseases such as heart disease, diabetes, or cancer may cause depression. ;  Events in your life. Depression can be triggered by stressful events in your life, such as the death of someone you love, a divorce, chronic illness, or loss of a job.   Medicines, drugs, or alcohol. Taking certain medicines, abusing drugs or alcohol, or having other illnesses can also lead to depression.;     Depression is not caused by personal weakness, laziness, or lack of willpower.;     Can giving birth cause depression?;  In the days following the birth of a baby, it is common for some mothers to have mood swings. They may feel a little depressed, have a hard time concentrating, lose their appetite, or find that they can't sleep well even when the baby is asleep. This is called the baby blues and goes away within 10 days after delivery. However, some women have worse symptoms or symptoms that last longer. This is called postpartum depression.     How is depression treated?  Depression can be treated with medicines, with counseling " <[email or web address]/[email or web address]/prevention-wellness/emotional-wellbeing/mental-health/therapy-and-counseling.html>, or with both. A nutritious diet, exercising on a regular basis, and avoiding alcohol, drugs, and too much caffeine can also help.;     How long will the depression last?  This depends on how soon you get help. Left untreated, depression can last for weeks, months, or even years. The main risk in not getting treatment is suicide. Treatment can help depression lift in 8 to 12 weeks or less.;     What medicines are used to treat depression?;  Medicines that treat depression are called antidepressants. They help increase the number of chemical messengers (serotonin, norepinephrine, dopamine) in your brain.  Antidepressants work differently for different people. They also have different side effects. So, even if one medicine bothers you or doesn't work for you, another may help. You may notice improvement as soon as 1 week after you start taking the medicine. But you probably won't see the full effects for about 8 to 12 weeks. You may have side effects at first, but they tend to decrease after a couple of weeks. Don't stop taking the medicine without checking with your doctor first.;     How does counseling help?;   A combination of medicine and talk therapy is usually the most effective way of treating more severe depression. If you continue the combination treatment for at least a year, you are less likely to have depression come back.;you talk with a trained therapist or counselor about things that are going on in your life. The focus may be on your thoughts and beliefs, on things that happened in your past, or on your relationships. Or the focus may be on your behavior, how it's affecting you, and what you can do differently.      Tips on getting through depression  DO:  Pace yourself.   Get involved in activities that make you feel good or feel like you've achieved something.   Avoid drugs and  "alcohol. Both make depression worse. Both can cause dangerous side effects with antidepressant medicines.   Exercise often to make yourself feel better. Physical activity seems to cause a chemical reaction in the body that can improve your mood. Exercising 4 to 6 times a week for at least 30 minutes each time is a good goal. But even less activity can be helpful.   Eat balanced meals and healthful foods.   Get enough sleep.   Take your medicine and/or go to counseling as often as your doctor recommends. Your medicine won't work if you only take it once in a while.   Set small goals for yourself, because you may have less energy.   Encourage yourself.   Get as much information as you can about depression and how to treat it.   Call your doctor or the local suicide crisis center right away if you start thinking about suicide.  DON'T:  Don't isolate yourself. Stay in touch with your loved ones and friends, your [employer] advisor, and your family doctor.   Don't believe negative thoughts you may have, such as blaming yourself or expecting to fail. This thinking is part of depression. These thoughts will go away as your depression lifts.   Don't blame yourself for your depression. You didn't cause it.   Don't make major life decisions (for example, about separation or divorce). You may not be thinking clearly while you are depressed, so the decisions you make at this time may not be the best ones for you. If you must make a big decision, ask someone you trust to help you.   Don't expect to do everything you normally can. Set a realistic schedule.   Don't get discouraged. It will take time for your depression to lift fully. Be patient with yourself.   Don't give up.     READ: \"The Mindfulness and Acceptance Workbook for Depression\" by Bryan and Ricky  \"The Mindful Way Through Depression\" by Luisito Torres Segal, and Cabot-Zinn        Suicide;  People who have depression sometimes think about suicide. This " thinking is a common part of the depression. If you have thoughts about hurting yourself, tell someone. You could tell your doctor, your friends, your family, or call your local suicide hotline, such as the National Suicide Prevention Lifeline at 1-257.492.5000.;

## 2023-10-09 NOTE — PATIENT INSTRUCTIONS
F/U in 3 months for anxiety  Us of Pelvis to be done at your earliest  Refills sent to the pharmacy  Flu shot and Vit B12 roday  Call if any concerns  Monthly Vit B12 with the [facility]      Depression      What is depression?;  When doctors talk about depression, they mean the medical illness called major depression. Someone who has major depression has symptoms nearly every day, all day, for 2 weeks or longer. There is also a minor form of depression that causes less severe symptoms. Both kinds of depression have the same causes and treatment.;  Depression can affect people of all ages and is different for every person. A person who has depression can't control his or her feelings. If you or your child, teen, or older relative is depressed, it's not his or her fault.     Women are twice as likely as men to experience depression. The reason for this is unknown, but changes in a woman's hormone levels may be related to depression.     What are the symptoms of depression?;  The symptoms of depression are different for every person. You may have one or many of the symptoms listed below. Your symptoms may include only emotional or only physical symptoms, or both.;     Emotional symptoms  Crying easily or for no reason ;  Feeling guilty or worthless ;  Feeling restless, irritated, and easily annoyed ;  Feeling sad, numb, or hopeless ;  Losing interest or pleasure in things you used to enjoy (including sex) ;  Thinking about death or suicide;     Physical symptoms  Changes in appetite (eating more than usual, or eating less than usual) ;  Feeling very tired all the time ;  Having other aches and pains that don't get better with treatment ;  Having trouble paying attention, recalling things, concentrating, and making decisions ;  Headaches, backaches, or digestive problems ;  Sleeping too much, or having problems sleeping ;  Unintended weight loss or gain;  The symptoms of depression may be different for children, teens, and  "seniors.;     What causes depression?  Depression may be caused by an imbalance of chemicals in the brain. Sometimes there aren't enough chemical messengers (called neurotransmitters) in the brain. Examples of neurotransmitters that affect your mood are serotonin (say: \"sair-a-tone-in\"), norepinephrine (say: \"nor-ep-in-ef-rin\"), and dopamine (say: \"dope-a-mean\"). A chemical imbalance in the brain may be caused by one or more of the following:;  Your genes. Sometimes depression is hereditary, meaning it runs in your family. If you have a parent or sibling who has depression, you may be more at risk for having depression yourself. ;  A medical condition. Problems with your thyroid, nutrient deficiencies, or chronic diseases such as heart disease, diabetes, or cancer may cause depression. ;  Events in your life. Depression can be triggered by stressful events in your life, such as the death of someone you love, a divorce, chronic illness, or loss of a job.   Medicines, drugs, or alcohol. Taking certain medicines, abusing drugs or alcohol, or having other illnesses can also lead to depression.;     Depression is not caused by personal weakness, laziness, or lack of willpower.;     Can giving birth cause depression?;  In the days following the birth of a baby, it is common for some mothers to have mood swings. They may feel a little depressed, have a hard time concentrating, lose their appetite, or find that they can't sleep well even when the baby is asleep. This is called the baby blues and goes away within 10 days after delivery. However, some women have worse symptoms or symptoms that last longer. This is called postpartum depression.     How is depression treated?  Depression can be treated with medicines, with counseling <http://[email or web address]/en/prevention-wellness/emotional-wellbeing/[email or web address]-health/therapy-and-counseling.html>, or with both. A nutritious diet, exercising on a regular basis, and " avoiding alcohol, drugs, and too much caffeine can also help.;     How long will the depression last?  This depends on how soon you get help. Left untreated, depression can last for weeks, months, or even years. The main risk in not getting treatment is suicide. Treatment can help depression lift in 8 to 12 weeks or less.;     What medicines are used to treat depression?;  Medicines that treat depression are called antidepressants. They help increase the number of chemical messengers (serotonin, norepinephrine, dopamine) in your brain.  Antidepressants work differently for different people. They also have different side effects. So, even if one medicine bothers you or doesn't work for you, another may help. You may notice improvement as soon as 1 week after you start taking the medicine. But you probably won't see the full effects for about 8 to 12 weeks. You may have side effects at first, but they tend to decrease after a couple of weeks. Don't stop taking the medicine without checking with your doctor first.;     How does counseling help?;   A combination of medicine and talk therapy is usually the most effective way of treating more severe depression. If you continue the combination treatment for at least a year, you are less likely to have depression come back.;you talk with a trained therapist or counselor about things that are going on in your life. The focus may be on your thoughts and beliefs, on things that happened in your past, or on your relationships. Or the focus may be on your behavior, how it's affecting you, and what you can do differently.      Tips on getting through depression  DO:  Pace yourself.   Get involved in activities that make you feel good or feel like you've achieved something.   Avoid drugs and alcohol. Both make depression worse. Both can cause dangerous side effects with antidepressant medicines.   Exercise often to make yourself feel better. Physical activity seems to cause a  "chemical reaction in the body that can improve your mood. Exercising 4 to 6 times a week for at least 30 minutes each time is a good goal. But even less activity can be helpful.   Eat balanced meals and healthful foods.   Get enough sleep.   Take your medicine and/or go to counseling as often as your doctor recommends. Your medicine won't work if you only take it once in a while.   Set small goals for yourself, because you may have less energy.   Encourage yourself.   Get as much information as you can about depression and how to treat it.   Call your doctor or the local suicide crisis center right away if you start thinking about suicide.  DON'T:  Don't isolate yourself. Stay in touch with your loved ones and friends, your [employer] advisor, and your family doctor.   Don't believe negative thoughts you may have, such as blaming yourself or expecting to fail. This thinking is part of depression. These thoughts will go away as your depression lifts.   Don't blame yourself for your depression. You didn't cause it.   Don't make major life decisions (for example, about separation or divorce). You may not be thinking clearly while you are depressed, so the decisions you make at this time may not be the best ones for you. If you must make a big decision, ask someone you trust to help you.   Don't expect to do everything you normally can. Set a realistic schedule.   Don't get discouraged. It will take time for your depression to lift fully. Be patient with yourself.   Don't give up.     READ: \"The Mindfulness and Acceptance Workbook for Depression\" by Bryan and Ricky  \"The Mindful Way Through Depression\" by Luisito Torres Segal, and Cabot-Zinn        Suicide;  People who have depression sometimes think about suicide. This thinking is a common part of the depression. If you have thoughts about hurting yourself, tell someone. You could tell your doctor, your friends, your family, or call your local suicide hotline, " "such as the National Suicide Prevention Lifeline at 1-168.237.4178.;  \"3-2-5-Almost None!\"  Healthy Habits Start Early    EAT 5 OR MORE SERVINGS OF VEGETABLES AND FRUITS EVERY DAY.    Help Dee get three vegetables and two fruits each day. Red, green, yellow, orange...encourage them to try all the colors so they can enjoy different flavors and get more vitamins.    How can I help Dee do this?  ---------------------------------------------  -BE PATIENT WITH Dee, remember it may take 10 times before they start to like new food. So, start with small bites and just keep trying.  -Serve at least one vegetable or fruit at every meal. Even try two. Remember, portions do not have to be as big as you think.  -Encourage eating fruits and vegetables instead of drinking them..it's a better way to get fiber and vitamins..so limit the amount of juice to 1/2 cup per day for children 1-6 years and one cup per day for children 7-18 years of age. Try using 1/2 part water and 1/2 part juice.    Spend less than two hours per day watching television and other screen media. Screen media includes video games, movies and computer use for entertainment.    How can I help Dee do this?  -Turn off the TV at dinner. Dinner is the best time to hang out with your kids and just talk, learn about their day, and tell them about your day. Your kids have a lot to learn from you and dinner is a great time to share.  "

## 2023-11-20 ENCOUNTER — CLINICAL SUPPORT (OUTPATIENT)
Dept: PRIMARY CARE | Facility: CLINIC | Age: 38
End: 2023-11-20
Payer: COMMERCIAL

## 2023-11-20 DIAGNOSIS — E53.8 VITAMIN B 12 DEFICIENCY: ICD-10-CM

## 2023-11-20 PROCEDURE — 96372 THER/PROPH/DIAG INJ SC/IM: CPT | Performed by: FAMILY MEDICINE

## 2023-11-20 RX ORDER — CYANOCOBALAMIN 1000 UG/ML
1000 INJECTION, SOLUTION INTRAMUSCULAR; SUBCUTANEOUS ONCE
Status: COMPLETED | OUTPATIENT
Start: 2023-11-20 | End: 2023-11-20

## 2023-11-20 RX ADMIN — CYANOCOBALAMIN 1000 MCG: 1000 INJECTION, SOLUTION INTRAMUSCULAR; SUBCUTANEOUS at 16:08

## 2023-11-20 NOTE — PROGRESS NOTES
Pt presents for monthly B12 injection. 1 mL given subcutaneously in upper L arm; no issues w/ injection. Pt tolerated well.

## 2024-02-06 ENCOUNTER — LAB (OUTPATIENT)
Dept: LAB | Facility: LAB | Age: 39
End: 2024-02-06
Payer: COMMERCIAL

## 2024-02-06 DIAGNOSIS — L70.0 ACNE VULGARIS: Primary | ICD-10-CM

## 2024-02-06 PROCEDURE — 36415 COLL VENOUS BLD VENIPUNCTURE: CPT

## 2024-02-06 PROCEDURE — 84132 ASSAY OF SERUM POTASSIUM: CPT

## 2024-02-07 LAB — POTASSIUM SERPL-SCNC: 4 MMOL/L (ref 3.5–5.3)

## 2024-02-09 ENCOUNTER — APPOINTMENT (OUTPATIENT)
Dept: PRIMARY CARE | Facility: CLINIC | Age: 39
End: 2024-02-09
Payer: COMMERCIAL

## 2024-02-12 ENCOUNTER — CLINICAL SUPPORT (OUTPATIENT)
Dept: PRIMARY CARE | Facility: CLINIC | Age: 39
End: 2024-02-12
Payer: COMMERCIAL

## 2024-02-12 DIAGNOSIS — E53.8 VITAMIN B 12 DEFICIENCY: ICD-10-CM

## 2024-02-12 PROCEDURE — 96372 THER/PROPH/DIAG INJ SC/IM: CPT | Performed by: FAMILY MEDICINE

## 2024-02-12 RX ORDER — CYANOCOBALAMIN 1000 UG/ML
1000 INJECTION, SOLUTION INTRAMUSCULAR; SUBCUTANEOUS ONCE
Status: COMPLETED | OUTPATIENT
Start: 2024-02-12 | End: 2024-02-12

## 2024-02-12 RX ADMIN — CYANOCOBALAMIN 1000 MCG: 1000 INJECTION, SOLUTION INTRAMUSCULAR; SUBCUTANEOUS at 16:12

## 2024-02-12 NOTE — PROGRESS NOTES
Pt presents for routine B12 injection per Dr Frank. 1 mL given subcutaneously in upper L arm; no issues w/ injection. Pt tolerated well.

## 2024-04-02 DIAGNOSIS — F41.9 ANXIETY: ICD-10-CM

## 2024-04-03 RX ORDER — FLUOXETINE 10 MG/1
10 CAPSULE ORAL DAILY
Qty: 30 CAPSULE | Refills: 0 | Status: SHIPPED | OUTPATIENT
Start: 2024-04-03 | End: 2024-05-02

## 2024-05-02 DIAGNOSIS — F41.9 ANXIETY: ICD-10-CM

## 2024-05-02 RX ORDER — FLUOXETINE 10 MG/1
CAPSULE ORAL
Qty: 30 CAPSULE | Refills: 0 | Status: SHIPPED | OUTPATIENT
Start: 2024-05-02 | End: 2024-06-11 | Stop reason: SDUPTHER

## 2024-05-20 ENCOUNTER — CLINICAL SUPPORT (OUTPATIENT)
Dept: PRIMARY CARE | Facility: CLINIC | Age: 39
End: 2024-05-20
Payer: COMMERCIAL

## 2024-05-20 DIAGNOSIS — E53.8 VITAMIN B 12 DEFICIENCY: ICD-10-CM

## 2024-05-20 PROCEDURE — 96372 THER/PROPH/DIAG INJ SC/IM: CPT | Performed by: FAMILY MEDICINE

## 2024-05-20 RX ORDER — CYANOCOBALAMIN 1000 UG/ML
1000 INJECTION, SOLUTION INTRAMUSCULAR; SUBCUTANEOUS ONCE
Status: COMPLETED | OUTPATIENT
Start: 2024-05-20 | End: 2024-05-20

## 2024-05-20 RX ADMIN — CYANOCOBALAMIN 1000 MCG: 1000 INJECTION, SOLUTION INTRAMUSCULAR; SUBCUTANEOUS at 15:46

## 2024-05-20 NOTE — PROGRESS NOTES
Pt presents for routine B12 injection per Dr Frank. 1 mL given IM in L delt; no issues w/ injection. Pt tolerated well.

## 2024-05-21 ENCOUNTER — APPOINTMENT (OUTPATIENT)
Dept: PRIMARY CARE | Facility: CLINIC | Age: 39
End: 2024-05-21
Payer: COMMERCIAL

## 2024-06-06 DIAGNOSIS — F41.9 ANXIETY: ICD-10-CM

## 2024-06-06 RX ORDER — FLUOXETINE 10 MG/1
CAPSULE ORAL
Qty: 30 CAPSULE | Refills: 0 | OUTPATIENT
Start: 2024-06-06

## 2024-06-11 RX ORDER — FLUOXETINE 10 MG/1
CAPSULE ORAL
Qty: 30 CAPSULE | Refills: 0 | Status: SHIPPED | OUTPATIENT
Start: 2024-06-11

## 2024-07-04 DIAGNOSIS — F41.9 ANXIETY: ICD-10-CM

## 2024-07-05 RX ORDER — FLUOXETINE 10 MG/1
CAPSULE ORAL
Qty: 90 CAPSULE | Refills: 0 | Status: SHIPPED | OUTPATIENT
Start: 2024-07-05

## 2024-07-10 ENCOUNTER — APPOINTMENT (OUTPATIENT)
Dept: PRIMARY CARE | Facility: CLINIC | Age: 39
End: 2024-07-10
Payer: COMMERCIAL

## 2024-07-10 ENCOUNTER — LAB (OUTPATIENT)
Dept: LAB | Facility: LAB | Age: 39
End: 2024-07-10
Payer: COMMERCIAL

## 2024-07-10 VITALS
WEIGHT: 191.25 LBS | OXYGEN SATURATION: 98 % | HEART RATE: 77 BPM | BODY MASS INDEX: 32.83 KG/M2 | DIASTOLIC BLOOD PRESSURE: 85 MMHG | SYSTOLIC BLOOD PRESSURE: 124 MMHG

## 2024-07-10 DIAGNOSIS — N83.202 CYST OF LEFT OVARY: ICD-10-CM

## 2024-07-10 DIAGNOSIS — E55.9 VITAMIN D DEFICIENCY: ICD-10-CM

## 2024-07-10 DIAGNOSIS — Z00.00 ENCOUNTER FOR ANNUAL GENERAL MEDICAL EXAMINATION WITHOUT ABNORMAL FINDINGS IN ADULT: ICD-10-CM

## 2024-07-10 DIAGNOSIS — R25.2 MUSCLE CRAMPS: ICD-10-CM

## 2024-07-10 DIAGNOSIS — E53.8 CYANOCOBALAMINE DEFICIENCY (NON ANEMIC): Primary | ICD-10-CM

## 2024-07-10 DIAGNOSIS — F41.9 ANXIETY: ICD-10-CM

## 2024-07-10 DIAGNOSIS — E53.8 CYANOCOBALAMINE DEFICIENCY (NON ANEMIC): ICD-10-CM

## 2024-07-10 DIAGNOSIS — N64.4 BREAST PAIN: ICD-10-CM

## 2024-07-10 PROBLEM — M54.2 NECK PAIN, ACUTE: Status: RESOLVED | Noted: 2023-05-25 | Resolved: 2024-07-10

## 2024-07-10 PROCEDURE — 96372 THER/PROPH/DIAG INJ SC/IM: CPT | Performed by: FAMILY MEDICINE

## 2024-07-10 PROCEDURE — 87389 HIV-1 AG W/HIV-1&-2 AB AG IA: CPT

## 2024-07-10 PROCEDURE — 82306 VITAMIN D 25 HYDROXY: CPT

## 2024-07-10 PROCEDURE — 83550 IRON BINDING TEST: CPT

## 2024-07-10 PROCEDURE — 83735 ASSAY OF MAGNESIUM: CPT

## 2024-07-10 PROCEDURE — 80053 COMPREHEN METABOLIC PANEL: CPT

## 2024-07-10 PROCEDURE — 83540 ASSAY OF IRON: CPT

## 2024-07-10 PROCEDURE — 82728 ASSAY OF FERRITIN: CPT

## 2024-07-10 PROCEDURE — 86803 HEPATITIS C AB TEST: CPT

## 2024-07-10 PROCEDURE — 1036F TOBACCO NON-USER: CPT | Performed by: FAMILY MEDICINE

## 2024-07-10 PROCEDURE — 83036 HEMOGLOBIN GLYCOSYLATED A1C: CPT

## 2024-07-10 PROCEDURE — 99215 OFFICE O/P EST HI 40 MIN: CPT | Performed by: FAMILY MEDICINE

## 2024-07-10 PROCEDURE — 85025 COMPLETE CBC W/AUTO DIFF WBC: CPT

## 2024-07-10 PROCEDURE — 86038 ANTINUCLEAR ANTIBODIES: CPT

## 2024-07-10 PROCEDURE — 84550 ASSAY OF BLOOD/URIC ACID: CPT

## 2024-07-10 PROCEDURE — 36415 COLL VENOUS BLD VENIPUNCTURE: CPT

## 2024-07-10 PROCEDURE — 82550 ASSAY OF CK (CPK): CPT

## 2024-07-10 PROCEDURE — 82746 ASSAY OF FOLIC ACID SERUM: CPT

## 2024-07-10 PROCEDURE — 84443 ASSAY THYROID STIM HORMONE: CPT

## 2024-07-10 PROCEDURE — 85652 RBC SED RATE AUTOMATED: CPT

## 2024-07-10 RX ORDER — SPIRONOLACTONE 50 MG/1
100 TABLET, FILM COATED ORAL DAILY
COMMUNITY
Start: 2024-06-03

## 2024-07-10 RX ORDER — ADAPALENE AND BENZOYL PEROXIDE 3; 25 MG/G; MG/G
GEL TOPICAL NIGHTLY
COMMUNITY
Start: 2024-04-03

## 2024-07-10 RX ORDER — CYANOCOBALAMIN 1000 UG/ML
1000 INJECTION, SOLUTION INTRAMUSCULAR; SUBCUTANEOUS ONCE
Status: COMPLETED | OUTPATIENT
Start: 2024-07-10 | End: 2024-07-10

## 2024-07-10 ASSESSMENT — ENCOUNTER SYMPTOMS
ABDOMINAL PAIN: 0
TROUBLE SWALLOWING: 0
DECREASED CONCENTRATION: 0
UNEXPECTED WEIGHT CHANGE: 0
COMPULSIONS: 0
NECK STIFFNESS: 0
COUGH: 0
IRRITABILITY: 0
APPETITE CHANGE: 0
SLEEP DISTURBANCE: 0
NAUSEA: 0
BLOOD IN STOOL: 0
ADENOPATHY: 0
MALAISE: 0
CONSTIPATION: 0
COLOR CHANGE: 0
BACK PAIN: 0
POLYDIPSIA: 0
EYE REDNESS: 0
BRUISES/BLEEDS EASILY: 0
CHILLS: 0
CONFUSION: 0
DYSURIA: 0
PALPITATIONS: 0
SHORTNESS OF BREATH: 0
MYALGIAS: 1
FLANK PAIN: 0
PANIC: 0
SINUS PAIN: 0
HYPERVENTILATION: 0
SINUS PRESSURE: 0
VOMITING: 0
CHEST TIGHTNESS: 0
WHEEZING: 0
FEELING OF CHOKING: 0
JOINT SWELLING: 0
WOUND: 0
HEMATURIA: 0
THOUGHT CONTENT - OBSESSIONS: 0
FACIAL SWELLING: 0
EYE PAIN: 0
AGITATION: 0
DEPRESSED MOOD: 0
ACTIVITY CHANGE: 0
FREQUENCY: 0
RESTLESSNESS: 0
RHINORRHEA: 0
DIARRHEA: 0
INSOMNIA: 0
FATIGUE: 0
EYE DISCHARGE: 0
DIAPHORESIS: 0
DIZZINESS: 0
SORE THROAT: 0
POLYPHAGIA: 0
VOICE CHANGE: 0
EYE ITCHING: 0
ARTHRALGIAS: 0
FEVER: 0
NERVOUS/ANXIOUS: 0
MUSCLE TENSION: 0

## 2024-07-10 ASSESSMENT — ANXIETY QUESTIONNAIRES
6. BECOMING EASILY ANNOYED OR IRRITABLE: NOT AT ALL
2. NOT BEING ABLE TO STOP OR CONTROL WORRYING: NOT AT ALL
3. WORRYING TOO MUCH ABOUT DIFFERENT THINGS: NOT AT ALL
1. FEELING NERVOUS, ANXIOUS, OR ON EDGE: NOT AT ALL
7. FEELING AFRAID AS IF SOMETHING AWFUL MIGHT HAPPEN: NOT AT ALL
5. BEING SO RESTLESS THAT IT IS HARD TO SIT STILL: NOT AT ALL
GAD7 TOTAL SCORE: 0
4. TROUBLE RELAXING: NOT AT ALL

## 2024-07-10 NOTE — PROGRESS NOTES
Subjective   Patient ID: Dee Richmond is a 39 y.o. female who presents for Follow-up (Anxiety. Sore to touch under arms) and B12 Injection.  Today she is accompanied by alone.     Anxiety  Presents for follow-up visit. Patient reports no chest pain, compulsions, confusion, decreased concentration, depressed mood, dizziness, dry mouth, excessive worry, feeling of choking, hyperventilation, impotence, insomnia, irritability, malaise, muscle tension, nausea, nervous/anxious behavior, obsessions, palpitations, panic, restlessness, shortness of breath or suicidal ideas. Symptoms occur rarely. The quality of sleep is good. Nighttime awakenings: none.     Compliance with medications is %.     Muscle cramps for the last 1 month  Has been on Spironolactone 200 mg since Jan      Review of Systems   Constitutional:  Negative for activity change, appetite change, chills, diaphoresis, fatigue, fever, irritability and unexpected weight change.   HENT:  Negative for congestion, dental problem, drooling, ear discharge, ear pain, facial swelling, hearing loss, nosebleeds, postnasal drip, rhinorrhea, sinus pressure, sinus pain, sneezing, sore throat, tinnitus, trouble swallowing and voice change.    Eyes:  Negative for pain, discharge, redness, itching and visual disturbance.   Respiratory:  Negative for cough, chest tightness, shortness of breath and wheezing.    Cardiovascular:  Negative for chest pain, palpitations and leg swelling.   Gastrointestinal:  Negative for abdominal pain, blood in stool, constipation, diarrhea, nausea and vomiting.   Endocrine: Negative for cold intolerance, heat intolerance, polydipsia, polyphagia and polyuria.   Genitourinary:  Negative for decreased urine volume, dysuria, flank pain, frequency, hematuria, impotence and urgency.   Musculoskeletal:  Positive for myalgias. Negative for arthralgias, back pain, gait problem, joint swelling and neck stiffness.   Skin:  Negative for color change,  pallor, rash and wound.   Neurological:  Negative for dizziness.   Hematological:  Negative for adenopathy. Does not bruise/bleed easily.   Psychiatric/Behavioral:  Negative for agitation, behavioral problems, confusion, decreased concentration, sleep disturbance and suicidal ideas. The patient is not nervous/anxious and does not have insomnia.        Objective   /85   Pulse 77   Wt 86.8 kg (191 lb 4 oz)   SpO2 98%   BMI 32.83 kg/m²   BSA: 1.98 meters squared  Growth percentiles: Facility age limit for growth %reno is 20 years. Facility age limit for growth %reno is 20 years.     Physical Exam  Vitals and nursing note reviewed. Exam conducted with a chaperone present.   Constitutional:       General: She is not in acute distress.     Appearance: Normal appearance. She is normal weight. She is not ill-appearing or toxic-appearing.   HENT:      Head: Normocephalic.      Right Ear: Tympanic membrane, ear canal and external ear normal. There is no impacted cerumen.      Left Ear: Tympanic membrane, ear canal and external ear normal. There is no impacted cerumen.      Nose: Nose normal. No congestion or rhinorrhea.      Mouth/Throat:      Mouth: Mucous membranes are moist.      Pharynx: Oropharynx is clear. No oropharyngeal exudate or posterior oropharyngeal erythema.   Eyes:      General: No scleral icterus.        Right eye: No discharge.         Left eye: No discharge.      Extraocular Movements: Extraocular movements intact.      Conjunctiva/sclera: Conjunctivae normal.      Pupils: Pupils are equal, round, and reactive to light.   Neck:      Vascular: No carotid bruit.   Cardiovascular:      Rate and Rhythm: Normal rate and regular rhythm.      Pulses: Normal pulses.      Heart sounds: Normal heart sounds. No murmur heard.     No gallop.   Pulmonary:      Effort: Pulmonary effort is normal. No respiratory distress.      Breath sounds: Normal breath sounds. No wheezing or rhonchi.   Chest:      Chest wall:  No mass, lacerations, deformity, swelling, tenderness, crepitus or edema. There is no dullness to percussion.   Breasts:     Right: Tenderness present.      Left: Tenderness (upper outer quadrant) present.   Abdominal:      General: Abdomen is flat. Bowel sounds are normal.      Palpations: Abdomen is soft. There is no mass.      Tenderness: There is no abdominal tenderness. There is no guarding or rebound.      Hernia: No hernia is present.   Musculoskeletal:         General: No swelling or tenderness. Normal range of motion.      Cervical back: Normal range of motion and neck supple. No rigidity or tenderness.      Right lower leg: No edema.      Left lower leg: No edema.   Lymphadenopathy:      Cervical: No cervical adenopathy.      Upper Body:      Right upper body: No supraclavicular, axillary or pectoral adenopathy.      Left upper body: No supraclavicular, axillary or pectoral adenopathy.   Skin:     General: Skin is warm and dry.      Coloration: Skin is not pale.      Findings: No bruising, erythema or rash.   Neurological:      General: No focal deficit present.      Mental Status: She is alert and oriented to person, place, and time.      Sensory: No sensory deficit.      Motor: No weakness.      Coordination: Coordination normal.      Gait: Gait normal.      Deep Tendon Reflexes: Reflexes normal.   Psychiatric:         Mood and Affect: Mood normal.         Behavior: Behavior normal.         Thought Content: Thought content normal.         Judgment: Judgment normal.         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    Relevant Medications    cyanocobalamin (Vitamin B-12) injection 1,000 mcg (Completed)    Other Relevant Orders    Folate    Iron and TIBC    Ferritin    Anxiety F41.9    Muscle cramps R25.2    Relevant Orders    WES with  Reflex to YURI    CBC and Auto Differential    Comprehensive Metabolic Panel    Magnesium    Folate    TSH with reflex to Free T4 if abnormal    Sedimentation Rate    Vitamin D 25-Hydroxy,Total (for eval of Vitamin D levels)    Uric Acid    Hemoglobin A1C    Creatine Kinase    Breast pain N64.4    Relevant Orders    BI mammo bilateral diagnostic     Other Visit Diagnoses         Codes    Encounter for annual general medical examination without abnormal findings in adult     Z00.00    Relevant Orders    HIV 1/2 Antigen/Antibody Screen with Reflex to Confirmation    Hepatitis C Antibody    Vitamin D deficiency     E55.9    Relevant Orders    Vitamin D 25-Hydroxy,Total (for eval of Vitamin D levels)          Current Outpatient Medications   Medication Sig Dispense Refill    adapalene-benzoyl peroxide 0.3-2.5 % gel with pump Apply topically once daily at bedtime.      FLUoxetine (PROzac) 10 mg capsule TAKE 1 CAPSULE BY MOUTH EVERY DAY *MUST USE MAIL-ORDER* 90 capsule 0    spironolactone (Aldactone) 50 mg tablet Take 2 tablets (100 mg) by mouth once daily.       No current facility-administered medications for this visit.       Needs a US of the ovary  Labs today  F/U for Annual wellness in 4 weeks  Call if the muscle cramps get worse  Encouraged to increase electrolytes   Will call with lab results  Call back if muscle crams get worse

## 2024-07-11 LAB
25(OH)D3 SERPL-MCNC: 57 NG/ML (ref 30–100)
ALBUMIN SERPL BCP-MCNC: 4.6 G/DL (ref 3.4–5)
ALP SERPL-CCNC: 73 U/L (ref 33–110)
ALT SERPL W P-5'-P-CCNC: 18 U/L (ref 7–45)
ANION GAP SERPL CALC-SCNC: 18 MMOL/L (ref 10–20)
AST SERPL W P-5'-P-CCNC: 18 U/L (ref 9–39)
BASOPHILS # BLD AUTO: 0.03 X10*3/UL (ref 0–0.1)
BASOPHILS NFR BLD AUTO: 0.4 %
BILIRUB SERPL-MCNC: 0.8 MG/DL (ref 0–1.2)
BUN SERPL-MCNC: 10 MG/DL (ref 6–23)
CALCIUM SERPL-MCNC: 10.1 MG/DL (ref 8.6–10.6)
CHLORIDE SERPL-SCNC: 102 MMOL/L (ref 98–107)
CK SERPL-CCNC: 99 U/L (ref 0–215)
CO2 SERPL-SCNC: 21 MMOL/L (ref 21–32)
CREAT SERPL-MCNC: 0.7 MG/DL (ref 0.5–1.05)
EGFRCR SERPLBLD CKD-EPI 2021: >90 ML/MIN/1.73M*2
EOSINOPHIL # BLD AUTO: 0.18 X10*3/UL (ref 0–0.7)
EOSINOPHIL NFR BLD AUTO: 2.4 %
ERYTHROCYTE [DISTWIDTH] IN BLOOD BY AUTOMATED COUNT: 13.2 % (ref 11.5–14.5)
ERYTHROCYTE [SEDIMENTATION RATE] IN BLOOD BY WESTERGREN METHOD: 16 MM/H (ref 0–20)
EST. AVERAGE GLUCOSE BLD GHB EST-MCNC: 94 MG/DL
FERRITIN SERPL-MCNC: 40 NG/ML (ref 8–150)
FOLATE SERPL-MCNC: 18.7 NG/ML
GLUCOSE SERPL-MCNC: 88 MG/DL (ref 74–99)
HBA1C MFR BLD: 4.9 %
HCT VFR BLD AUTO: 39.4 % (ref 36–46)
HCV AB SER QL: NONREACTIVE
HGB BLD-MCNC: 12.6 G/DL (ref 12–16)
HIV 1+2 AB+HIV1 P24 AG SERPL QL IA: NONREACTIVE
IMM GRANULOCYTES # BLD AUTO: 0.03 X10*3/UL (ref 0–0.7)
IMM GRANULOCYTES NFR BLD AUTO: 0.4 % (ref 0–0.9)
IRON SATN MFR SERPL: 14 % (ref 25–45)
IRON SERPL-MCNC: 65 UG/DL (ref 35–150)
LYMPHOCYTES # BLD AUTO: 1.01 X10*3/UL (ref 1.2–4.8)
LYMPHOCYTES NFR BLD AUTO: 13.3 %
MAGNESIUM SERPL-MCNC: 2.17 MG/DL (ref 1.6–2.4)
MCH RBC QN AUTO: 29.2 PG (ref 26–34)
MCHC RBC AUTO-ENTMCNC: 32 G/DL (ref 32–36)
MCV RBC AUTO: 91 FL (ref 80–100)
MONOCYTES # BLD AUTO: 0.38 X10*3/UL (ref 0.1–1)
MONOCYTES NFR BLD AUTO: 5 %
NEUTROPHILS # BLD AUTO: 5.96 X10*3/UL (ref 1.2–7.7)
NEUTROPHILS NFR BLD AUTO: 78.5 %
NRBC BLD-RTO: 0 /100 WBCS (ref 0–0)
PLATELET # BLD AUTO: 320 X10*3/UL (ref 150–450)
POTASSIUM SERPL-SCNC: 4.2 MMOL/L (ref 3.5–5.3)
PROT SERPL-MCNC: 7.3 G/DL (ref 6.4–8.2)
RBC # BLD AUTO: 4.32 X10*6/UL (ref 4–5.2)
SODIUM SERPL-SCNC: 137 MMOL/L (ref 136–145)
TIBC SERPL-MCNC: 451 UG/DL (ref 240–445)
TSH SERPL-ACNC: 2.11 MIU/L (ref 0.44–3.98)
UIBC SERPL-MCNC: 386 UG/DL (ref 110–370)
URATE SERPL-MCNC: 4.2 MG/DL (ref 2.3–6.7)
WBC # BLD AUTO: 7.6 X10*3/UL (ref 4.4–11.3)

## 2024-07-12 LAB — ANA SER QL HEP2 SUBST: NEGATIVE

## 2024-07-17 ENCOUNTER — HOSPITAL ENCOUNTER (OUTPATIENT)
Dept: RADIOLOGY | Facility: CLINIC | Age: 39
Discharge: HOME | End: 2024-07-17
Payer: COMMERCIAL

## 2024-07-17 DIAGNOSIS — N83.202 CYST OF LEFT OVARY: ICD-10-CM

## 2024-07-17 PROCEDURE — 76830 TRANSVAGINAL US NON-OB: CPT

## 2024-07-20 NOTE — RESULT ENCOUNTER NOTE
Has a cyst on her ovary and the small fibroid.  Would like to see her back to discuss results.  If she has a gynecologist would recommend that she follow-up with them

## 2024-07-30 ENCOUNTER — HOSPITAL ENCOUNTER (OUTPATIENT)
Dept: RADIOLOGY | Facility: HOSPITAL | Age: 39
Discharge: HOME | End: 2024-07-30
Payer: COMMERCIAL

## 2024-07-30 VITALS — BODY MASS INDEX: 33.66 KG/M2 | HEIGHT: 63 IN | WEIGHT: 190 LBS

## 2024-07-30 DIAGNOSIS — N64.4 BREAST PAIN: ICD-10-CM

## 2024-07-30 DIAGNOSIS — N64.4 MASTODYNIA: ICD-10-CM

## 2024-07-30 PROCEDURE — 77062 BREAST TOMOSYNTHESIS BI: CPT

## 2024-07-30 PROCEDURE — 77066 DX MAMMO INCL CAD BI: CPT

## 2024-09-03 DIAGNOSIS — F41.9 ANXIETY: ICD-10-CM

## 2024-09-03 NOTE — TELEPHONE ENCOUNTER
Fax request from [facility] requesting a refill on:    FLUoxetine (PROzac) 10 mg capsule    TAKE 1 CAPSULE BY MOUTH EVERY DAY *MUST USE MAIL-ORDER*   Quantity: 90 capsule     [facility] - [facility] AT Portal to [facility] Sites   [phone]

## 2024-09-04 RX ORDER — FLUOXETINE 10 MG/1
CAPSULE ORAL
Qty: 90 CAPSULE | Refills: 0 | Status: SHIPPED | OUTPATIENT
Start: 2024-09-04

## 2024-09-09 ENCOUNTER — CLINICAL SUPPORT (OUTPATIENT)
Dept: PRIMARY CARE | Facility: CLINIC | Age: 39
End: 2024-09-09
Payer: COMMERCIAL

## 2024-09-09 DIAGNOSIS — E53.8 VITAMIN B 12 DEFICIENCY: ICD-10-CM

## 2024-09-09 PROCEDURE — 96372 THER/PROPH/DIAG INJ SC/IM: CPT | Performed by: FAMILY MEDICINE

## 2024-09-09 RX ORDER — CYANOCOBALAMIN 1000 UG/ML
1000 INJECTION, SOLUTION INTRAMUSCULAR; SUBCUTANEOUS ONCE
Status: COMPLETED | OUTPATIENT
Start: 2024-09-09 | End: 2024-09-09

## 2024-11-15 ENCOUNTER — CLINICAL SUPPORT (OUTPATIENT)
Dept: PRIMARY CARE | Facility: CLINIC | Age: 39
End: 2024-11-15
Payer: COMMERCIAL

## 2024-11-15 DIAGNOSIS — E53.8 VITAMIN B 12 DEFICIENCY: ICD-10-CM

## 2024-11-15 PROCEDURE — 96372 THER/PROPH/DIAG INJ SC/IM: CPT | Performed by: FAMILY MEDICINE

## 2024-11-15 RX ORDER — CYANOCOBALAMIN 1000 UG/ML
1000 INJECTION, SOLUTION INTRAMUSCULAR; SUBCUTANEOUS ONCE
Status: COMPLETED | OUTPATIENT
Start: 2024-11-15 | End: 2024-11-15

## 2024-12-16 ENCOUNTER — TELEPHONE (OUTPATIENT)
Dept: PRIMARY CARE | Facility: CLINIC | Age: 39
End: 2024-12-16
Payer: COMMERCIAL

## 2024-12-16 DIAGNOSIS — F41.9 ANXIETY: ICD-10-CM

## 2024-12-16 RX ORDER — FLUOXETINE 10 MG/1
CAPSULE ORAL
Qty: 90 CAPSULE | Refills: 0 | Status: SHIPPED | OUTPATIENT
Start: 2024-12-16

## 2024-12-16 NOTE — TELEPHONE ENCOUNTER
Fax request from [facility] requesting a refill on:    FLUoxetine (PROzac) 10 mg capsule   TAKE 1 CAPSULE BY MOUTH EVERY DAY *MUST USE MAIL-ORDER   Quantity: 90 capsule     [facility] - [facility] AT Portal to [facility] Sites   [phone]

## 2025-02-03 ENCOUNTER — PATIENT MESSAGE (OUTPATIENT)
Dept: PRIMARY CARE | Facility: CLINIC | Age: 40
End: 2025-02-03

## 2025-02-03 ENCOUNTER — OFFICE VISIT (OUTPATIENT)
Dept: PRIMARY CARE | Facility: CLINIC | Age: 40
End: 2025-02-03
Payer: COMMERCIAL

## 2025-02-03 VITALS
OXYGEN SATURATION: 98 % | TEMPERATURE: 102.9 F | DIASTOLIC BLOOD PRESSURE: 81 MMHG | SYSTOLIC BLOOD PRESSURE: 129 MMHG | HEIGHT: 63 IN | BODY MASS INDEX: 35.37 KG/M2 | WEIGHT: 199.6 LBS | HEART RATE: 102 BPM

## 2025-02-03 DIAGNOSIS — B34.9 VIRAL ILLNESS: Primary | ICD-10-CM

## 2025-02-03 DIAGNOSIS — R50.9 FEVER, UNSPECIFIED FEVER CAUSE: ICD-10-CM

## 2025-02-03 DIAGNOSIS — J10.1 INFLUENZA A: ICD-10-CM

## 2025-02-03 DIAGNOSIS — F41.9 ANXIETY: ICD-10-CM

## 2025-02-03 PROBLEM — N91.3 PRIMARY OLIGOMENORRHEA: Status: ACTIVE | Noted: 2017-07-12

## 2025-02-03 LAB — POC RAPID INFLUENZA A: POSITIVE

## 2025-02-03 PROCEDURE — 1036F TOBACCO NON-USER: CPT | Performed by: FAMILY MEDICINE

## 2025-02-03 PROCEDURE — 87804 INFLUENZA ASSAY W/OPTIC: CPT | Performed by: FAMILY MEDICINE

## 2025-02-03 PROCEDURE — 99214 OFFICE O/P EST MOD 30 MIN: CPT | Performed by: FAMILY MEDICINE

## 2025-02-03 PROCEDURE — 3008F BODY MASS INDEX DOCD: CPT | Performed by: FAMILY MEDICINE

## 2025-02-03 RX ORDER — OSELTAMIVIR PHOSPHATE 75 MG/1
75 CAPSULE ORAL 2 TIMES DAILY
Qty: 10 CAPSULE | Refills: 0 | Status: SHIPPED | OUTPATIENT
Start: 2025-02-03 | End: 2025-02-08

## 2025-02-03 ASSESSMENT — ENCOUNTER SYMPTOMS
NECK PAIN: 0
NAUSEA: 0
SINUS PAIN: 0
OCCASIONAL FEELINGS OF UNSTEADINESS: 0
SWOLLEN GLANDS: 0
DIARRHEA: 0
SORE THROAT: 1
LOSS OF SENSATION IN FEET: 0
ABDOMINAL PAIN: 0
DYSURIA: 0
DEPRESSION: 0
COUGH: 1
HEADACHES: 1
VOMITING: 0
WHEEZING: 0
RHINORRHEA: 1

## 2025-02-03 NOTE — PROGRESS NOTES
"Subjective   Patient ID: Dee Richmond is a 39 y.o. female who presents for URI (Started a week ago with fatigue. Last night started with sore throat and cough congestion,  Fever, chills, tested negative for covid ).  Today she is accompanied by alone.     URI   This is a new problem. The current episode started yesterday. The maximum temperature recorded prior to her arrival was 102 - 102.9 F. The fever has been present for 1 to 2 days. Associated symptoms include congestion, coughing, ear pain, headaches, rhinorrhea and a sore throat. Pertinent negatives include no abdominal pain, chest pain, diarrhea, dysuria, joint pain, joint swelling, nausea, neck pain, plugged ear sensation, rash, sinus pain, sneezing, swollen glands, vomiting or wheezing. She has tried increased fluids for the symptoms. The treatment provided no relief.       Review of Systems   HENT:  Positive for congestion, ear pain, rhinorrhea and sore throat. Negative for sinus pain and sneezing.    Respiratory:  Positive for cough. Negative for wheezing.    Cardiovascular:  Negative for chest pain.   Gastrointestinal:  Negative for abdominal pain, diarrhea, nausea and vomiting.   Genitourinary:  Negative for dysuria.   Musculoskeletal:  Negative for joint pain and neck pain.   Skin:  Negative for rash.   Neurological:  Positive for headaches.       Objective   /81   Pulse 102   Temp (!) 39.4 °C (102.9 °F) (Oral)   Ht 1.6 m (5' 3\")   Wt 90.5 kg (199 lb 9.6 oz)   SpO2 98%   BMI 35.36 kg/m²   BSA: 2.01 meters squared  Growth percentiles: Facility age limit for growth %reon is 20 years. Facility age limit for growth %reno is 20 years.     Physical Exam  Vitals and nursing note reviewed.   Constitutional:       General: She is not in acute distress.     Appearance: Normal appearance. She is ill-appearing.   HENT:      Head: Normocephalic.      Right Ear: Tympanic membrane, ear canal and external ear normal. There is no impacted cerumen.      " Left Ear: Ear canal and external ear normal. There is no impacted cerumen.      Nose: Congestion present. No rhinorrhea.      Mouth/Throat:      Mouth: Mucous membranes are moist.      Pharynx: Posterior oropharyngeal erythema present. No oropharyngeal exudate.   Eyes:      General: No scleral icterus.        Right eye: No discharge.         Left eye: No discharge.      Extraocular Movements: Extraocular movements intact.      Conjunctiva/sclera: Conjunctivae normal.      Pupils: Pupils are equal, round, and reactive to light.   Cardiovascular:      Rate and Rhythm: Normal rate and regular rhythm.      Pulses: Normal pulses.      Heart sounds: Normal heart sounds. No murmur heard.     No friction rub. No gallop.   Pulmonary:      Effort: Pulmonary effort is normal. No respiratory distress.      Breath sounds: Normal breath sounds. No stridor. No wheezing, rhonchi or rales.   Chest:      Chest wall: No tenderness.   Musculoskeletal:         General: Normal range of motion.      Cervical back: Normal range of motion and neck supple. Tenderness present. No rigidity.      Right lower leg: No edema.      Left lower leg: No edema.   Lymphadenopathy:      Cervical: Cervical adenopathy present.   Skin:     General: Skin is warm and dry.   Neurological:      Mental Status: She is alert and oriented to person, place, and time.   Psychiatric:         Mood and Affect: Mood normal.         Behavior: Behavior normal.         Thought Content: Thought content normal.         Judgment: Judgment normal.         Assessment/Plan   Problem List Items Addressed This Visit    None  Visit Diagnoses         Codes    Viral illness    -  Primary B34.9    Relevant Orders    Flu,Sars,Rsv; [facility]; 28391 - Miscellaneous Test    Fever, unspecified fever cause     R50.9    Relevant Orders    Flu,Sars,Rsv; [facility]; 41912 - Miscellaneous Test    POCT Influenza A/B manually resulted (Completed)    Influenza A     J10.1    Relevant  Medications    oseltamivir (Tamiflu) 75 mg capsule            Current Outpatient Medications   Medication Sig Dispense Refill    spironolactone (Aldactone) 50 mg tablet Take 2 tablets (100 mg) by mouth once daily.      adapalene-benzoyl peroxide 0.3-2.5 % gel with pump Apply topically once daily at bedtime. (Patient not taking: Reported on 2/3/2025)      FLUoxetine (PROzac) 10 mg capsule TAKE 1 CAPSULE BY MOUTH EVERY DAY *MUST USE MAIL-ORDER* (Patient not taking: Reported on 2/3/2025) 90 capsule 0    oseltamivir (Tamiflu) 75 mg capsule Take 1 capsule (75 mg) by mouth 2 times a day for 5 days. 10 capsule 0     No current facility-administered medications for this visit.   Flu test positive  Start NielMed nasal rinse 2 times a day then follow it with Flonase  Can use Saline spray throughout the day  Cool mist humidifier  Mucinex FastMax or SinusMax or Tylenol severe cold and flu as needed  Call if any worse or if no improvement in 3-5 days  Increase fluids

## 2025-02-03 NOTE — PATIENT INSTRUCTIONS
Start NielMed nasal rinse 2 times a day then follow it with Flonase  Can use Saline spray throughout the day  Cool mist humidifier  Mucinex FastMax or SinusMax or Tylenol severe cold and flu as needed  Call if any worse or if no improvement in 3-5 days  Increase fluids    Current Outpatient Medications   Medication Sig Dispense Refill    spironolactone (Aldactone) 50 mg tablet Take 2 tablets (100 mg) by mouth once daily.      adapalene-benzoyl peroxide 0.3-2.5 % gel with pump Apply topically once daily at bedtime. (Patient not taking: Reported on 2/3/2025)      FLUoxetine (PROzac) 10 mg capsule TAKE 1 CAPSULE BY MOUTH EVERY DAY *MUST USE MAIL-ORDER* (Patient not taking: Reported on 2/3/2025) 90 capsule 0    oseltamivir (Tamiflu) 75 mg capsule Take 1 capsule (75 mg) by mouth 2 times a day for 5 days. 10 capsule 0     No current facility-administered medications for this visit.

## 2025-02-05 RX ORDER — FLUOXETINE 10 MG/1
CAPSULE ORAL
Qty: 90 CAPSULE | Refills: 0 | Status: SHIPPED | OUTPATIENT
Start: 2025-02-05

## 2025-04-18 DIAGNOSIS — F41.9 ANXIETY: ICD-10-CM

## 2025-04-21 RX ORDER — FLUOXETINE 10 MG/1
10 CAPSULE ORAL DAILY
Qty: 90 CAPSULE | Refills: 0 | Status: SHIPPED | OUTPATIENT
Start: 2025-04-21

## 2025-07-02 DIAGNOSIS — F41.9 ANXIETY: ICD-10-CM

## 2025-07-07 RX ORDER — FLUOXETINE 10 MG/1
10 CAPSULE ORAL DAILY
Qty: 90 CAPSULE | Refills: 0 | Status: SHIPPED | OUTPATIENT
Start: 2025-07-07